# Patient Record
Sex: MALE | Race: BLACK OR AFRICAN AMERICAN
[De-identification: names, ages, dates, MRNs, and addresses within clinical notes are randomized per-mention and may not be internally consistent; named-entity substitution may affect disease eponyms.]

---

## 2021-05-19 ENCOUNTER — HOSPITAL ENCOUNTER (EMERGENCY)
Dept: HOSPITAL 53 - M ED | Age: 41
Discharge: HOME | End: 2021-05-19
Payer: COMMERCIAL

## 2021-05-19 VITALS — HEIGHT: 63 IN | BODY MASS INDEX: 20.42 KG/M2 | WEIGHT: 115.24 LBS

## 2021-05-19 VITALS — DIASTOLIC BLOOD PRESSURE: 79 MMHG | SYSTOLIC BLOOD PRESSURE: 134 MMHG

## 2021-05-19 DIAGNOSIS — E11.9: ICD-10-CM
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
## 2021-05-19 DIAGNOSIS — S30.0XXA: ICD-10-CM

## 2021-05-19 DIAGNOSIS — Z79.84: ICD-10-CM

## 2021-05-19 DIAGNOSIS — G89.29: ICD-10-CM
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
## 2021-05-19 DIAGNOSIS — Z79.899: ICD-10-CM

## 2021-05-19 DIAGNOSIS — S00.93XA: Primary | ICD-10-CM

## 2021-05-19 DIAGNOSIS — M54.9: ICD-10-CM
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
## 2021-05-19 DIAGNOSIS — I10: ICD-10-CM
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
## 2021-05-19 NOTE — REP
INDICATION:

fall.



COMPARISON:

None



TECHNIQUE:

Standard helical technique using 2 mm increments and reconstructed in both sagittal

and coronal planes



FINDINGS:

Vertebral body height and alignment is within normal limits.  The disc spaces are

symmetric and well maintained throughout.  The facet joints are well aligned

bilaterally.  There is no acute fracture subluxation.  There is no abnormal paraspinal

soft tissue swelling.



IMPRESSION:

No acute abnormality.





<Electronically signed by Alexandre Olsen > 05/19/21 1500

## 2021-05-19 NOTE — REP
INDICATION:

fall.



COMPARISON:

None.



TECHNIQUE:

4.5 mm contiguous transaxial sections were obtained from the skull base to the

cerebral convexities with thin cuts through the posterior fossa without the

administration of intravenous contrast.



FINDINGS:

The ventricles and sulci are consistent with the patient's age.  There are no

extra-axial fluid collections.  There is no mass effect.  The deep cerebral white

matter is consistent with the patient's age.



The orbital and petrous structures, cerebellopontine angles, and posterior fossa are

unremarkable.



The sella turcica, cavernous, and paracavernous structures are essentially

unremarkable.



The visualized portions of the paranasal sinuses and mastoid air cells are clear.

Images of the skull base show no gross abnormality.





IMPRESSION:

Essentially unremarkable CT examination of the brain.





<Electronically signed by Alexandre Olsen > 05/19/21 1105

## 2021-05-19 NOTE — REP
INDICATION:

fall.



COMPARISON:

None



TECHNIQUE:

Standard helical technique using 4 mm increments and reconstructed in both sagittal

coronal planes



FINDINGS:

CT cannot rule out an acute disc extrusion.



Vertebral body height and alignment is within normal limits.  The disc spaces are

symmetric and well maintained throughout.  There is no acute fracture or abnormal

subluxation.  The facet joints are within normal limits bilaterally.  There is no

evidence of abnormal paraspinal soft tissue density.



IMPRESSION:

CT findings are within normal limits.





<Electronically signed by Alexandre Olsen > 05/19/21 1734

## 2022-06-02 ENCOUNTER — HOSPITAL ENCOUNTER (INPATIENT)
Dept: HOSPITAL 53 - M ED | Age: 42
LOS: 5 days | Discharge: HOME | DRG: 885 | End: 2022-06-07
Attending: STUDENT IN AN ORGANIZED HEALTH CARE EDUCATION/TRAINING PROGRAM | Admitting: STUDENT IN AN ORGANIZED HEALTH CARE EDUCATION/TRAINING PROGRAM
Payer: COMMERCIAL

## 2022-06-02 VITALS — BODY MASS INDEX: 26.63 KG/M2 | WEIGHT: 150.31 LBS | HEIGHT: 63 IN

## 2022-06-02 DIAGNOSIS — Z87.820: ICD-10-CM

## 2022-06-02 DIAGNOSIS — E78.5: ICD-10-CM

## 2022-06-02 DIAGNOSIS — F10.10: ICD-10-CM

## 2022-06-02 DIAGNOSIS — F33.2: Primary | ICD-10-CM

## 2022-06-02 DIAGNOSIS — F43.10: ICD-10-CM

## 2022-06-02 DIAGNOSIS — E11.9: ICD-10-CM

## 2022-06-02 DIAGNOSIS — I10: ICD-10-CM

## 2022-06-02 DIAGNOSIS — Z79.84: ICD-10-CM

## 2022-06-02 DIAGNOSIS — Z79.899: ICD-10-CM

## 2022-06-02 DIAGNOSIS — G43.909: ICD-10-CM

## 2022-06-02 LAB
ALBUMIN SERPL BCG-MCNC: 4.4 GM/DL (ref 3.2–5.2)
ALT SERPL W P-5'-P-CCNC: 66 U/L (ref 12–78)
AMPHETAMINES UR QL SCN: NEGATIVE
APAP SERPL-MCNC: < 2 UG/ML (ref 10–30)
BARBITURATES UR QL SCN: NEGATIVE
BENZODIAZ UR QL SCN: NEGATIVE
BILIRUB CONJ SERPL-MCNC: 0.1 MG/DL (ref 0–0.2)
BILIRUB SERPL-MCNC: 0.9 MG/DL (ref 0.2–1)
BUN SERPL-MCNC: 15 MG/DL (ref 7–18)
BZE UR QL SCN: NEGATIVE
CALCIUM SERPL-MCNC: 10.3 MG/DL (ref 8.5–10.1)
CANNABINOIDS UR QL SCN: NEGATIVE
CHLORIDE SERPL-SCNC: 106 MEQ/L (ref 98–107)
CO2 SERPL-SCNC: 29 MEQ/L (ref 21–32)
CREAT SERPL-MCNC: 1.06 MG/DL (ref 0.7–1.3)
ETHANOL SERPL-MCNC: < 0.003 % (ref 0–0.01)
GFR SERPL CREATININE-BSD FRML MDRD: > 60 ML/MIN/{1.73_M2} (ref 60–?)
GLUCOSE SERPL-MCNC: 155 MG/DL (ref 70–100)
HCT VFR BLD AUTO: 47 % (ref 42–52)
HGB BLD-MCNC: 15.1 G/DL (ref 13.5–17.5)
MCH RBC QN AUTO: 27.1 PG (ref 27–33)
MCHC RBC AUTO-ENTMCNC: 32.1 G/DL (ref 32–36.5)
MCV RBC AUTO: 84.4 FL (ref 80–96)
METHADONE UR QL SCN: NEGATIVE
OPIATES UR QL SCN: NEGATIVE
PCP UR QL SCN: NEGATIVE
PLATELET # BLD AUTO: 290 10^3/UL (ref 150–450)
POTASSIUM SERPL-SCNC: 4 MEQ/L (ref 3.5–5.1)
PROT SERPL-MCNC: 8.7 GM/DL (ref 6.4–8.2)
RBC # BLD AUTO: 5.57 10^6/UL (ref 4.3–6.1)
RSV RNA NPH QL NAA+PROBE: NEGATIVE
SALICYLATES SERPL-MCNC: < 1.7 MG/DL (ref 5–30)
SODIUM SERPL-SCNC: 140 MEQ/L (ref 136–145)
TSH SERPL DL<=0.005 MIU/L-ACNC: 1.33 UIU/ML (ref 0.36–3.74)
WBC # BLD AUTO: 6.4 10^3/UL (ref 4–10)

## 2022-06-03 VITALS — SYSTOLIC BLOOD PRESSURE: 145 MMHG | DIASTOLIC BLOOD PRESSURE: 92 MMHG

## 2022-06-03 RX ADMIN — SITAGLIPTIN SCH MG: 50 TABLET, FILM COATED ORAL at 22:42

## 2022-06-03 RX ADMIN — MAGNESIUM OXIDE SCH MG: 400 TABLET ORAL at 22:42

## 2022-06-03 RX ADMIN — ROSUVASTATIN CALCIUM SCH MG: 10 TABLET, FILM COATED ORAL at 22:41

## 2022-06-03 RX ADMIN — INSULIN LISPRO SCH UNITS: 100 INJECTION, SOLUTION INTRAVENOUS; SUBCUTANEOUS at 09:20

## 2022-06-03 RX ADMIN — Medication SCH EA: at 09:00

## 2022-06-03 RX ADMIN — INSULIN LISPRO SCH UNITS: 100 INJECTION, SOLUTION INTRAVENOUS; SUBCUTANEOUS at 13:09

## 2022-06-04 VITALS — DIASTOLIC BLOOD PRESSURE: 92 MMHG | SYSTOLIC BLOOD PRESSURE: 148 MMHG

## 2022-06-04 VITALS — DIASTOLIC BLOOD PRESSURE: 82 MMHG | SYSTOLIC BLOOD PRESSURE: 144 MMHG

## 2022-06-04 RX ADMIN — METFORMIN HYDROCHLORIDE SCH MG: 500 TABLET, EXTENDED RELEASE ORAL at 17:23

## 2022-06-04 RX ADMIN — Medication SCH EA: at 09:00

## 2022-06-04 RX ADMIN — METFORMIN HYDROCHLORIDE SCH MG: 500 TABLET, EXTENDED RELEASE ORAL at 10:31

## 2022-06-04 RX ADMIN — MAGNESIUM OXIDE SCH MG: 400 TABLET ORAL at 21:43

## 2022-06-04 RX ADMIN — VALSARTAN SCH MG: 80 TABLET ORAL at 10:32

## 2022-06-04 RX ADMIN — SITAGLIPTIN SCH MG: 50 TABLET, FILM COATED ORAL at 10:31

## 2022-06-04 RX ADMIN — SITAGLIPTIN SCH MG: 50 TABLET, FILM COATED ORAL at 21:42

## 2022-06-04 RX ADMIN — INSULIN LISPRO SCH UNITS: 100 INJECTION, SOLUTION INTRAVENOUS; SUBCUTANEOUS at 07:05

## 2022-06-04 RX ADMIN — INSULIN LISPRO SCH UNITS: 100 INJECTION, SOLUTION INTRAVENOUS; SUBCUTANEOUS at 12:00

## 2022-06-04 RX ADMIN — ROSUVASTATIN CALCIUM SCH MG: 10 TABLET, FILM COATED ORAL at 21:42

## 2022-06-04 RX ADMIN — INSULIN LISPRO SCH UNITS: 100 INJECTION, SOLUTION INTRAVENOUS; SUBCUTANEOUS at 17:24

## 2022-06-04 RX ADMIN — FLUOXETINE HYDROCHLORIDE SCH MG: 20 CAPSULE ORAL at 10:32

## 2022-06-04 RX ADMIN — MAGNESIUM OXIDE SCH MG: 400 TABLET ORAL at 10:32

## 2022-06-05 VITALS — SYSTOLIC BLOOD PRESSURE: 128 MMHG | DIASTOLIC BLOOD PRESSURE: 83 MMHG

## 2022-06-05 RX ADMIN — SITAGLIPTIN SCH MG: 50 TABLET, FILM COATED ORAL at 08:26

## 2022-06-05 RX ADMIN — MAGNESIUM OXIDE SCH MG: 400 TABLET ORAL at 08:27

## 2022-06-05 RX ADMIN — SITAGLIPTIN SCH MG: 50 TABLET, FILM COATED ORAL at 21:53

## 2022-06-05 RX ADMIN — FLUOXETINE HYDROCHLORIDE SCH MG: 20 CAPSULE ORAL at 08:27

## 2022-06-05 RX ADMIN — VALSARTAN SCH MG: 80 TABLET ORAL at 08:27

## 2022-06-05 RX ADMIN — INSULIN LISPRO SCH UNITS: 100 INJECTION, SOLUTION INTRAVENOUS; SUBCUTANEOUS at 17:30

## 2022-06-05 RX ADMIN — Medication SCH EA: at 08:29

## 2022-06-05 RX ADMIN — MAGNESIUM OXIDE SCH MG: 400 TABLET ORAL at 21:53

## 2022-06-05 RX ADMIN — ROSUVASTATIN CALCIUM SCH MG: 10 TABLET, FILM COATED ORAL at 21:52

## 2022-06-05 RX ADMIN — METFORMIN HYDROCHLORIDE SCH MG: 500 TABLET, EXTENDED RELEASE ORAL at 17:52

## 2022-06-05 RX ADMIN — INSULIN LISPRO SCH UNITS: 100 INJECTION, SOLUTION INTRAVENOUS; SUBCUTANEOUS at 06:51

## 2022-06-05 RX ADMIN — INSULIN LISPRO SCH UNITS: 100 INJECTION, SOLUTION INTRAVENOUS; SUBCUTANEOUS at 12:00

## 2022-06-05 RX ADMIN — METFORMIN HYDROCHLORIDE SCH MG: 500 TABLET, EXTENDED RELEASE ORAL at 08:27

## 2022-06-06 VITALS — DIASTOLIC BLOOD PRESSURE: 84 MMHG | SYSTOLIC BLOOD PRESSURE: 152 MMHG

## 2022-06-06 VITALS — DIASTOLIC BLOOD PRESSURE: 95 MMHG | SYSTOLIC BLOOD PRESSURE: 145 MMHG

## 2022-06-06 LAB — EST. AVERAGE GLUCOSE BLD GHB EST-MCNC: 148 MG/DL (ref 60–110)

## 2022-06-06 RX ADMIN — VALSARTAN SCH MG: 80 TABLET ORAL at 07:41

## 2022-06-06 RX ADMIN — MAGNESIUM OXIDE SCH MG: 400 TABLET ORAL at 07:40

## 2022-06-06 RX ADMIN — MAGNESIUM OXIDE SCH MG: 400 TABLET ORAL at 20:25

## 2022-06-06 RX ADMIN — INSULIN LISPRO SCH UNITS: 100 INJECTION, SOLUTION INTRAVENOUS; SUBCUTANEOUS at 12:00

## 2022-06-06 RX ADMIN — SITAGLIPTIN SCH MG: 50 TABLET, FILM COATED ORAL at 07:41

## 2022-06-06 RX ADMIN — ROSUVASTATIN CALCIUM SCH MG: 10 TABLET, FILM COATED ORAL at 20:24

## 2022-06-06 RX ADMIN — FLUOXETINE HYDROCHLORIDE SCH MG: 20 CAPSULE ORAL at 07:40

## 2022-06-06 RX ADMIN — INSULIN LISPRO SCH UNITS: 100 INJECTION, SOLUTION INTRAVENOUS; SUBCUTANEOUS at 17:20

## 2022-06-06 RX ADMIN — SITAGLIPTIN SCH MG: 50 TABLET, FILM COATED ORAL at 20:25

## 2022-06-06 RX ADMIN — INSULIN LISPRO SCH UNITS: 100 INJECTION, SOLUTION INTRAVENOUS; SUBCUTANEOUS at 07:03

## 2022-06-06 RX ADMIN — METFORMIN HYDROCHLORIDE SCH MG: 500 TABLET, EXTENDED RELEASE ORAL at 07:40

## 2022-06-06 RX ADMIN — METFORMIN HYDROCHLORIDE SCH MG: 500 TABLET, EXTENDED RELEASE ORAL at 17:20

## 2022-06-07 VITALS — DIASTOLIC BLOOD PRESSURE: 88 MMHG | SYSTOLIC BLOOD PRESSURE: 152 MMHG

## 2022-06-07 VITALS — SYSTOLIC BLOOD PRESSURE: 152 MMHG | DIASTOLIC BLOOD PRESSURE: 88 MMHG

## 2022-06-07 RX ADMIN — METFORMIN HYDROCHLORIDE SCH MG: 500 TABLET, EXTENDED RELEASE ORAL at 07:56

## 2022-06-07 RX ADMIN — INSULIN LISPRO SCH UNITS: 100 INJECTION, SOLUTION INTRAVENOUS; SUBCUTANEOUS at 12:00

## 2022-06-07 RX ADMIN — MAGNESIUM OXIDE SCH MG: 400 TABLET ORAL at 07:56

## 2022-06-07 RX ADMIN — INSULIN LISPRO SCH UNITS: 100 INJECTION, SOLUTION INTRAVENOUS; SUBCUTANEOUS at 06:36

## 2022-06-07 RX ADMIN — SITAGLIPTIN SCH MG: 50 TABLET, FILM COATED ORAL at 07:56

## 2022-06-07 RX ADMIN — VALSARTAN SCH MG: 80 TABLET ORAL at 07:56

## 2022-06-07 RX ADMIN — FLUOXETINE HYDROCHLORIDE SCH MG: 20 CAPSULE ORAL at 07:56

## 2022-09-22 ENCOUNTER — HOSPITAL ENCOUNTER (OUTPATIENT)
Dept: HOSPITAL 53 - M RAD | Age: 42
End: 2022-09-22
Attending: NURSE PRACTITIONER
Payer: COMMERCIAL

## 2022-09-22 DIAGNOSIS — R06.02: Primary | ICD-10-CM

## 2022-10-13 ENCOUNTER — HOSPITAL ENCOUNTER (OUTPATIENT)
Dept: HOSPITAL 53 - M SOG | Age: 42
End: 2022-10-13
Attending: ORTHOPAEDIC SURGERY
Payer: COMMERCIAL

## 2022-10-13 DIAGNOSIS — M79.644: Primary | ICD-10-CM

## 2022-10-18 ENCOUNTER — HOSPITAL ENCOUNTER (OUTPATIENT)
Dept: HOSPITAL 53 - M CARPUL | Age: 42
End: 2022-10-18
Attending: NURSE PRACTITIONER
Payer: COMMERCIAL

## 2022-10-18 DIAGNOSIS — R06.02: Primary | ICD-10-CM

## 2022-10-27 ENCOUNTER — HOSPITAL ENCOUNTER (OUTPATIENT)
Dept: HOSPITAL 53 - M PLAIMG | Age: 42
End: 2022-10-27
Attending: ORTHOPAEDIC SURGERY
Payer: COMMERCIAL

## 2022-10-27 DIAGNOSIS — M79.644: Primary | ICD-10-CM
